# Patient Record
Sex: MALE | Race: WHITE | NOT HISPANIC OR LATINO | URBAN - METROPOLITAN AREA
[De-identification: names, ages, dates, MRNs, and addresses within clinical notes are randomized per-mention and may not be internally consistent; named-entity substitution may affect disease eponyms.]

---

## 2017-03-07 ENCOUNTER — PRE VISIT (OUTPATIENT)
Dept: NEUROLOGY | Facility: CLINIC | Age: 52
End: 2017-03-07

## 2017-03-07 NOTE — TELEPHONE ENCOUNTER
1.  Date/reason for appt: 3/25/17 neuropathy   2.  Referring provider: LIDIA RIGGINS  3.  Call to patient (Yes / No - short description): no, referral   4.  Previous care at / records requested from: [facility]  5.  Other: Records received from [facility].   Included  Office notes: 1/27/17, 9/28/16, 7/20/16, 2/18/16, 2/5/16  Radiology reports: MRI lumbar on 10/30/15    Missing/needed records: EMG, [facility], imaging

## 2017-03-07 NOTE — TELEPHONE ENCOUNTER
Additional records received from [facility].   Included,   Office notes: 5/23/16, 1/27/17(duplicate), 9/28/16(duplicate), 7/20/16(duplicate), 2/18/16(duplicate), 2/5/16(duplicate)

## 2017-03-22 NOTE — TELEPHONE ENCOUNTER
[facility] called image done at [facility]. Called [facility] and spoke with Mandi, image pushed. Notified the film room to pull image.

## 2017-03-22 NOTE — TELEPHONE ENCOUNTER
Spoke with patient. EMG done at [facility]. Faxed request.

## 2017-03-24 NOTE — TELEPHONE ENCOUNTER
Faxed request to [facility] to fax EMG to Neurology clinic since this is a Saturday appointment.

## 2017-03-25 ENCOUNTER — OFFICE VISIT (OUTPATIENT)
Dept: NEUROLOGY | Facility: CLINIC | Age: 52
End: 2017-03-25

## 2017-03-25 VITALS
HEIGHT: 71 IN | HEART RATE: 98 BPM | OXYGEN SATURATION: 97 % | SYSTOLIC BLOOD PRESSURE: 129 MMHG | WEIGHT: 182 LBS | BODY MASS INDEX: 25.48 KG/M2 | DIASTOLIC BLOOD PRESSURE: 85 MMHG | RESPIRATION RATE: 20 BRPM | TEMPERATURE: 97.8 F

## 2017-03-25 DIAGNOSIS — E11.40 TYPE 2 DIABETES MELLITUS WITH DIABETIC NEUROPATHY, WITHOUT LONG-TERM CURRENT USE OF INSULIN (H): Primary | ICD-10-CM

## 2017-03-25 RX ORDER — NORTRIPTYLINE HCL 25 MG
2 CAPSULE ORAL AT BEDTIME
COMMUNITY

## 2017-03-25 RX ORDER — ATORVASTATIN CALCIUM 20 MG/1
1 TABLET, FILM COATED ORAL DAILY
COMMUNITY
Start: 2016-09-28 | End: 2017-09-23

## 2017-03-25 RX ORDER — LIDOCAINE 50 MG/G
OINTMENT TOPICAL PRN
Qty: 30 G | Refills: 3 | Status: SHIPPED | OUTPATIENT
Start: 2017-03-25

## 2017-03-25 RX ORDER — TRAMADOL HYDROCHLORIDE 50 MG/1
1 TABLET ORAL PRN
COMMUNITY
Start: 2017-01-27 | End: 2017-03-28

## 2017-03-25 RX ORDER — PERPHENAZINE 16 MG
TABLET ORAL
Qty: 100 CAPSULE | Refills: 3 | Status: SHIPPED | OUTPATIENT
Start: 2017-03-25

## 2017-03-25 ASSESSMENT — ENCOUNTER SYMPTOMS
POLYPHAGIA: 0
TASTE DISTURBANCE: 0
NECK MASS: 0
MEMORY LOSS: 0
MYALGIAS: 1
EYE REDNESS: 1
WEAKNESS: 1
INCREASED ENERGY: 1
SORE THROAT: 1
TACHYCARDIA: 1
HEADACHES: 1
NECK PAIN: 1
HALLUCINATIONS: 0
DIZZINESS: 1
POSTURAL DYSPNEA: 0
TREMORS: 0
NAIL CHANGES: 0
DYSPNEA ON EXERTION: 1
SINUS PAIN: 1
PARALYSIS: 0
INSOMNIA: 1
DECREASED CONCENTRATION: 0
DEPRESSION: 0
SPEECH CHANGE: 0
NERVOUS/ANXIOUS: 1
FEVER: 1
TINGLING: 1
LIGHT-HEADEDNESS: 1
COUGH: 1
WEIGHT GAIN: 0
STIFFNESS: 1
ALTERED TEMPERATURE REGULATION: 1
DOUBLE VISION: 0
CLAUDICATION: 1
SEIZURES: 0
HOARSE VOICE: 0
WEIGHT LOSS: 0
POOR WOUND HEALING: 0
PANIC: 0
EYE PAIN: 1
SLEEP DISTURBANCES DUE TO BREATHING: 0
NUMBNESS: 1
WHEEZING: 1
SMELL DISTURBANCE: 0
FATIGUE: 1
EYE WATERING: 1
LOSS OF CONSCIOUSNESS: 0
MUSCLE CRAMPS: 1
SKIN CHANGES: 0
EYE IRRITATION: 1
LEG SWELLING: 1
COUGH DISTURBING SLEEP: 0
BACK PAIN: 1
SHORTNESS OF BREATH: 1
NIGHT SWEATS: 1
TROUBLE SWALLOWING: 0
HEMOPTYSIS: 0
DECREASED APPETITE: 0
SPUTUM PRODUCTION: 1
SNORES LOUDLY: 1
POLYDIPSIA: 0
ARTHRALGIAS: 1
PALPITATIONS: 1
SINUS CONGESTION: 1
CHILLS: 1
RESPIRATORY PAIN: 1
MUSCLE WEAKNESS: 1
EXERCISE INTOLERANCE: 0
SYNCOPE: 0
HYPERTENSION: 0
ORTHOPNEA: 0
HYPOTENSION: 0
LEG PAIN: 1
JOINT SWELLING: 1
DISTURBANCES IN COORDINATION: 1

## 2017-03-25 ASSESSMENT — PAIN SCALES - GENERAL: PAINLEVEL: SEVERE PAIN (7)

## 2017-03-25 NOTE — MR AVS SNAPSHOT
After Visit Summary   3/25/2017    Cortes Gimenez    MRN: [id number]           Patient Information     Date Of Birth          1965        Visit Information        Provider Department      3/25/2017 9:10 AM Mickey Ayala MD [facility] Neurology        Today's Diagnoses     Type 2 diabetes mellitus with diabetic neuropathy, without long-term current use of insulin (H)    -  1       Follow-ups after your visit        Who to contact     Please call your clinic at [phone] to:    Ask questions about your health    Make or cancel appointments    Discuss your medicines    Learn about your test results    Speak to your doctor   If you have compliments or concerns about an experience at your clinic, or if you wish to file a complaint, please contact [facility] Patient Relations at [phone] or email us at [email or web address]         Additional Information About Your Visit        MyChart Information     Spotie is an electronic gateway that provides easy, online access to your medical records. With Spotie, you can request a clinic appointment, read your test results, renew a prescription or communicate with your care team.     To sign up for [employer] visit the website at [email or web address]   You will be asked to enter the access code listed below, as well as some personal information. Please follow the directions to create your username and password.     Your access code is: [email or web address]  Expires: 2017 10:36 AM     Your access code will  in 90 days. If you need help or a new code, please contact your [facility] or call [phone] for assistance.        Care EveryWhere ID     This is your Care EveryWhere ID. This could be used by other organizations to access your [place] medical records  [id number]        Your Vitals Were     Pulse Temperature Respirations Height Pulse Oximetry BMI (Body Mass Index)  "   98 97.8  F (36.6  C) (Oral) 20 5' 10.75\" (179.7 cm) 97% 25.56 kg/m2       Blood Pressure from Last 3 Encounters:   03/25/17 129/85    Weight from Last 3 Encounters:   03/25/17 182 lb (82.6 kg)              Today, you had the following     No orders found for display         Today's Medication Changes          These changes are accurate as of: 3/25/17 11:59 PM.  If you have any questions, ask your nurse or doctor.               Start taking these medicines.        Dose/Directions    alpha-lipoic acid 600 MG Caps   Used for:  Type 2 diabetes mellitus with diabetic neuropathy, without long-term current use of insulin (H)   Started by:  Mickey Ayala MD        600mg daily   Quantity:  100 capsule   Refills:  3       lidocaine 5 % ointment   Commonly known as:  LIDOCAINE INGRID   Used for:  Type 2 diabetes mellitus with diabetic neuropathy, without long-term current use of insulin (H)   Started by:  Mickey Ayala MD        Apply topically as needed for moderate pain   Quantity:  30 g   Refills:  3            Where to get your medicines      These medications were sent to [facility] - [place], [place] - [address] [place] [address] [address] [place] [address] [address]  [address], [facility] [address]     Phone:  [phone]     alpha-lipoic acid 600 MG Caps    lidocaine 5 % ointment                Primary Care Provider Office Phone # Fax #    Pippa ISRRAEL Whaley [phone]       [place] [address] [address]  [facility] [id number]        Thank you!     Thank you for choosing [facility] NEUROLOG[facility]  for your care. Our goal is always to provide you with excellent care. Hearing back from our patients is one way we can continue to improve our services. Please take a few minutes to complete the written survey that you may receive in the mail after your visit with us. Thank you!             Your Updated Medication List - Protect others around you: Learn how to safely use, store and throw away your " medicines at www.disposemymeds.org.          This list is accurate as of: 3/25/17 11:59 PM.  Always use your most recent med list.                   Brand Name Dispense Instructions for use    alpha-lipoic acid 600 MG Caps     100 capsule    600mg daily       ASPIRIN PO      Take 81 mg by mouth daily       atorvastatin 20 MG tablet    LIPITOR     Take 1 tablet by mouth daily       empagliflozin 10 MG Tabs tablet    JARDIANCE     Take 1 tablet by mouth daily       lidocaine 5 % ointment    LIDOCAINE INGRID    30 g    Apply topically as needed for moderate pain       nortriptyline 25 MG capsule    PAMELOR     Take 2 capsules by mouth At Bedtime       traMADol 50 MG tablet    ULTRAM     Take 1 tablet by mouth as needed       ZYRTEC ALLERGY 10 MG Caps   Generic drug:  cetirizine HCl      Take 1 capsule by mouth daily

## 2017-03-25 NOTE — LETTER
"3/25/2017       RE: Cortes Gimenez  [address]  [facility] [address]     Dear Colleague,    Thank you for referring your patient, Cortes Gimenez, to the [facility] at [facility]. Please see a copy of my visit note below.    2017      Pippa Whaley MD   [facility]    [address] [place] [address]      RE: Cortes Gimenez   MRN: [id number]   : 1965      Dear Dr. Whaley:      Thank you for referring Cortes Gimenez for neurologic consultation on 2017.  The patient comes with a chief complaint of a painful polyneuropathy as well as right foot drop.      The patient said that he developed a right foot drop about 1-1/2 years ago.  He stopped crossing his leg at the suggestion of his neurologist and that actually has improved.  His other problem has been that of painful dysesthetic sensation in his feet over about the same time.  He said this involves the right foot more than the left.  He described a \"vice \" sensation involving his toes.  He did tell me he was formally diagnosed with diabetes about a year ago and he was told this probably did relate to the neuropathy by his neurologist.  The pain will awaken him.  He also has pain that is more specific involving the side of his right ankle.  He said his pain does tend to come and go.  He has noted for at least a year and a half pain involving the right posterior hip.  At times he has had it in both legs in that distribution.  He is not impotent.  He is able to control his urine and stool.  The patient said that initially the pain was worse in the right foot but then about 8 months ago it also started in the left foot.  The patient did have about a 20-pound weight loss a year and a half ago and then subsequently was diagnosed with the diabetes.  His weight has stabilized.  He does have a normal diet and eats fruits and vegetables and meat.  He is not a vegan or a " vegetarian.  He has not taken any unusual supplements such as zinc or B6.  He has not had any history of toxic exposure.  He probably was crossing his leg for a time.  The patient did bring with voluminous records from your office which are excellent.  He is being evaluated now for hemoglobin of 17.9.  He is under the care of a hematologist and he just had a negative OLU study.  His CO2 level was 1.8%, which was normal and he had a normal EPO.  His last glucose then was 186.  His ferritin level was elevated at 285 and his CO2 level was 30.4 and he had normal liver function tests and creatinine.  On 01/21/2016 he had negative hepatitis C screen and did have a TSH of 0.7.  His A1c hemoglobin then on that date was 11.2.  His B12 level was 835 picograms and his folate level 3.0.  His last MRI scan of the lumbar spine showed moderate foraminal narrowing at L4-L5 and at L5-S1 he had a synovial cyst.  The patient has had an ACL-MCL repair involving the left leg.        ALLERGIES:   He has allergies listed to metformin and penicillin as well as seasonal allergies.        He said his family history is basically unremarkable except for 1 sister having cancer of the thyroid and multiple problems with her spine and knee.  He has never drank to excess.  He does not smoke.  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  He was tried on gabapentin.  It was hard to tell from his description how high of a dose he received, but probably only 100 mg t.i.d.  I really could not tell that he had any side effects but he did not find it helpful.  He was placed on Lyrica 100 mg t.i.d. then and this probably helped and he had no side effects from the drug.  He definitely was helped by Tegretol 200 mg tablets 400 mg every 12 hours and he has been on that drug for the last 6 months.  He also has been on medication for diabetes Glyxambi, tramadol 50 mg 1 or 2 times per week, low dose aspirin, Zyrtec, Lipitor,  nortriptyline 50 mg.  He earlier was on 75 mg but described bizarre dreams.  This is his second marriage and his wife did come with today and they are quite supportive of each other.      The patient evidently did have an EMG done through the [facility] and he had ordered an MRI scan to see if he had impingement of the right peroneal nerve as this was diagnosed.  I do not have the actual studies, but just your notes that allude to this.      Neurologic examination revealed a pleasant man.  Gait  which did show that he had a very minimal right foot drop as he walked, cerebellar testing, including the ability to do tandem and negative Romberg with no dysmetria, muscle stretch reflexes that were absent, plantar stimulation, strength testing which was all normal except for moderate weakness of the right toe extensors, cranial nerve examination, superficial and cortical sensory testing are unremarkable except for decreased sensation to vibratory fork with decreased vibration that was very mild in the left foot and mild to moderate in the right medial malleolus with mild decreased proprioception bilaterally, more so in the right toes than the left, with decreased sensation to pinprick involving the toes to the dorsum of his feet to the ankles with variably decreased sensation to pinprick involving the left index finger in the right hand and the palmar surface of the fingers.  The patient's cranial nerve examination was unremarkable except for he does have miotic pupils.  It is hard to see his disks.  The patient did tell me that he had a basal cell cancer taken from the right shoulder and the left upper lip.  He had negative straight leg raising and negative reverse leg raising and good range of motion of his lower back without any paraspinal muscle abnormalities.      IMPRESSION:   1.  Right peroneal neuropathy, probably related in part to weight loss and crossing his legs as well as diabetes  mellitus.   2.  Painful sensory diabetic neuropathy.      I really cannot think of anything else to offer the patient that has not been tried.  I did suggest that he try alpha lipoic acid 600 mg for his painful diabetic neuropathy.  I talked with him about other treatments that could be given if this is not helpful.  This sometimes can be quite effective as a supplement.  I did talk with him about trying it for 2 months and if not better he could have further evaluation and treatment through the [facility].  I stressed the importance of not crossing his legs and did go over the anatomy with the patient and his wife of the peroneal nerve.      Sincerely yours,      Mickey Ayala MD      I spent 50 minutes with the patient.  Over 50% of the time this involved counseling and coordination of care.  A complete review of medical systems was done and a positive review is listed in the report above.      D: 2017 15:43   T: 2017 13:18   MT: AKA      Name:     ABHI ORANTES   MRN:      [id number]        Account:      [id number]   :      1965           Service Date: 2017      Document: [id number]

## 2017-04-03 NOTE — PROGRESS NOTES
"2017      Pippa Whaley MD   [facility]    [address] [place] [address]      RE: Cortes Gimenez   MRN: [id number]   : 1965      Dear Dr. Whaley:      Thank you for referring Cortes Gimenez for neurologic consultation on 2017.  The patient comes with a chief complaint of a painful polyneuropathy as well as right foot drop.      The patient said that he developed a right foot drop about 1-1/2 years ago.  He stopped crossing his leg at the suggestion of his neurologist and that actually has improved.  His other problem has been that of painful dysesthetic sensation in his feet over about the same time.  He said this involves the right foot more than the left.  He described a \"vice \" sensation involving his toes.  He did tell me he was formally diagnosed with diabetes about a year ago and he was told this probably did relate to the neuropathy by his neurologist.  The pain will awaken him.  He also has pain that is more specific involving the side of his right ankle.  He said his pain does tend to come and go.  He has noted for at least a year and a half pain involving the right posterior hip.  At times he has had it in both legs in that distribution.  He is not impotent.  He is able to control his urine and stool.  The patient said that initially the pain was worse in the right foot but then about 8 months ago it also started in the left foot.  The patient did have about a 20-pound weight loss a year and a half ago and then subsequently was diagnosed with the diabetes.  His weight has stabilized.  He does have a normal diet and eats fruits and vegetables and meat.  He is not a vegan or a vegetarian.  He has not taken any unusual supplements such as zinc or B6.  He has not had any history of toxic exposure.  He probably was crossing his leg for a time.  The patient did bring with voluminous records from your office which are excellent.  He is being evaluated " now for hemoglobin of 17.9.  He is under the care of a hematologist and he just had a negative OLU study.  His CO2 level was 1.8%, which was normal and he had a normal EPO.  His last glucose then was 186.  His ferritin level was elevated at 285 and his CO2 level was 30.4 and he had normal liver function tests and creatinine.  On 01/21/2016 he had negative hepatitis C screen and did have a TSH of 0.7.  His A1c hemoglobin then on that date was 11.2.  His B12 level was 835 picograms and his folate level 3.0.  His last MRI scan of the lumbar spine showed moderate foraminal narrowing at L4-L5 and at L5-S1 he had a synovial cyst.  The patient has had an ACL-MCL repair involving the left leg.        ALLERGIES:   He has allergies listed to metformin and penicillin as well as seasonal allergies.        He said his family history is basically unremarkable except for 1 sister having cancer of the thyroid and multiple problems with her spine and knee.  He has never drank to excess.  He does not smoke.  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  He was tried on gabapentin.  It was hard to tell from his description how high of a dose he received, but probably only 100 mg t.i.d.  I really could not tell that he had any side effects but he did not find it helpful.  He was placed on Lyrica 100 mg t.i.d. then and this probably helped and he had no side effects from the drug.  He definitely was helped by Tegretol 200 mg tablets 400 mg every 12 hours and he has been on that drug for the last 6 months.  He also has been on medication for diabetes Glyxambi, tramadol 50 mg 1 or 2 times per week, low dose aspirin, Zyrtec, Lipitor, nortriptyline 50 mg.  He earlier was on 75 mg but described bizarre dreams.  This is his second marriage and his wife did come with today and they are quite supportive of each other.      The patient evidently did have an EMG done through the [facility] and he had  ordered an MRI scan to see if he had impingement of the right peroneal nerve as this was diagnosed.  I do not have the actual studies, but just your notes that allude to this.      Neurologic examination revealed a pleasant man.  Gait  which did show that he had a very minimal right foot drop as he walked, cerebellar testing, including the ability to do tandem and negative Romberg with no dysmetria, muscle stretch reflexes that were absent, plantar stimulation, strength testing which was all normal except for moderate weakness of the right toe extensors, cranial nerve examination, superficial and cortical sensory testing are unremarkable except for decreased sensation to vibratory fork with decreased vibration that was very mild in the left foot and mild to moderate in the right medial malleolus with mild decreased proprioception bilaterally, more so in the right toes than the left, with decreased sensation to pinprick involving the toes to the dorsum of his feet to the ankles with variably decreased sensation to pinprick involving the left index finger in the right hand and the palmar surface of the fingers.  The patient's cranial nerve examination was unremarkable except for he does have miotic pupils.  It is hard to see his disks.  The patient did tell me that he had a basal cell cancer taken from the right shoulder and the left upper lip.  He had negative straight leg raising and negative reverse leg raising and good range of motion of his lower back without any paraspinal muscle abnormalities.      IMPRESSION:   1.  Right peroneal neuropathy, probably related in part to weight loss and crossing his legs as well as diabetes mellitus.   2.  Painful sensory diabetic neuropathy.      I really cannot think of anything else to offer the patient that has not been tried.  I did suggest that he try alpha lipoic acid 600 mg for his painful diabetic neuropathy.  I talked with him about other treatments that could be given  if this is not helpful.  This sometimes can be quite effective as a supplement.  I did talk with him about trying it for 2 months and if not better he could have further evaluation and treatment through the [facility].  I stressed the importance of not crossing his legs and did go over the anatomy with the patient and his wife of the peroneal nerve.      Sincerely yours,      Cesar Ayala MD      I spent 50 minutes with the patient.  Over 50% of the time this involved counseling and coordination of care.  A complete review of medical systems was done and a positive review is listed in the report above.         CESAR AYALA MD             D: 2017 15:43   T: 2017 13:18   MT: AKA      Name:     ABHI ORANTES   MRN:      [id number]        Account:      [id number]   :      1965           Service Date: 2017      Document: [id number]